# Patient Record
Sex: FEMALE | Race: WHITE | NOT HISPANIC OR LATINO | URBAN - METROPOLITAN AREA
[De-identification: names, ages, dates, MRNs, and addresses within clinical notes are randomized per-mention and may not be internally consistent; named-entity substitution may affect disease eponyms.]

---

## 2017-06-18 ENCOUNTER — EMERGENCY (EMERGENCY)
Facility: HOSPITAL | Age: 82
LOS: 1 days | Discharge: HOME | End: 2017-06-18
Attending: EMERGENCY MEDICINE
Payer: MEDICARE

## 2017-06-18 VITALS
RESPIRATION RATE: 18 BRPM | HEIGHT: 60 IN | DIASTOLIC BLOOD PRESSURE: 90 MMHG | SYSTOLIC BLOOD PRESSURE: 150 MMHG | WEIGHT: 119.93 LBS | OXYGEN SATURATION: 98 % | TEMPERATURE: 98 F | HEART RATE: 80 BPM
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
## 2017-06-18 DIAGNOSIS — Z88.6 ALLERGY STATUS TO ANALGESIC AGENT: ICD-10-CM

## 2017-06-18 DIAGNOSIS — S16.1XXA STRAIN OF MUSCLE, FASCIA AND TENDON AT NECK LEVEL, INITIAL ENCOUNTER: ICD-10-CM

## 2017-06-18 DIAGNOSIS — S00.93XA CONTUSION OF UNSPECIFIED PART OF HEAD, INITIAL ENCOUNTER: ICD-10-CM

## 2017-06-18 DIAGNOSIS — M19.90 UNSPECIFIED OSTEOARTHRITIS, UNSPECIFIED SITE: ICD-10-CM

## 2017-06-18 DIAGNOSIS — M41.9 SCOLIOSIS, UNSPECIFIED: ICD-10-CM

## 2017-06-18 DIAGNOSIS — M54.9 DORSALGIA, UNSPECIFIED: ICD-10-CM

## 2017-06-18 DIAGNOSIS — M47.892 OTHER SPONDYLOSIS, CERVICAL REGION: ICD-10-CM

## 2017-06-18 DIAGNOSIS — M85.88 OTHER SPECIFIED DISORDERS OF BONE DENSITY AND STRUCTURE, OTHER SITE: ICD-10-CM

## 2017-06-18 DIAGNOSIS — G89.29 OTHER CHRONIC PAIN: ICD-10-CM

## 2017-06-18 PROCEDURE — 70450 CT HEAD/BRAIN W/O DYE: CPT | Mod: 26

## 2017-06-18 PROCEDURE — 99284 EMERGENCY DEPT VISIT MOD MDM: CPT

## 2017-06-18 PROCEDURE — 99284 EMERGENCY DEPT VISIT MOD MDM: CPT | Mod: 25

## 2017-06-18 PROCEDURE — 72125 CT NECK SPINE W/O DYE: CPT

## 2017-06-18 PROCEDURE — 72125 CT NECK SPINE W/O DYE: CPT | Mod: 26

## 2017-06-18 PROCEDURE — 70450 CT HEAD/BRAIN W/O DYE: CPT

## 2017-06-18 NOTE — ED PROVIDER NOTE - CARE PLAN
Principal Discharge DX:	Injury of head, initial encounter  Secondary Diagnosis:	Cervical strain, acute, initial encounter

## 2017-06-18 NOTE — ED ADULT NURSE NOTE - OBJECTIVE STATEMENT
AOX3 +ambulatory patient reports she tripped and fell and hit her head with the handle bar of her cart. Denies any LOC
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
## 2017-06-27 ENCOUNTER — APPOINTMENT (OUTPATIENT)
Dept: INTERNAL MEDICINE | Facility: CLINIC | Age: 82
End: 2017-06-27

## 2017-06-27 VITALS
HEIGHT: 60 IN | RESPIRATION RATE: 16 BRPM | HEART RATE: 80 BPM | TEMPERATURE: 97.8 F | OXYGEN SATURATION: 97 % | BODY MASS INDEX: 23.16 KG/M2 | SYSTOLIC BLOOD PRESSURE: 120 MMHG | WEIGHT: 118 LBS | DIASTOLIC BLOOD PRESSURE: 78 MMHG

## 2017-08-08 ENCOUNTER — NON-APPOINTMENT (OUTPATIENT)
Age: 82
End: 2017-08-08

## 2017-08-08 ENCOUNTER — APPOINTMENT (OUTPATIENT)
Dept: INTERNAL MEDICINE | Facility: CLINIC | Age: 82
End: 2017-08-08
Payer: MEDICARE

## 2017-08-08 VITALS
HEART RATE: 82 BPM | RESPIRATION RATE: 16 BRPM | TEMPERATURE: 97.8 F | HEIGHT: 60 IN | DIASTOLIC BLOOD PRESSURE: 80 MMHG | SYSTOLIC BLOOD PRESSURE: 120 MMHG | BODY MASS INDEX: 23.16 KG/M2 | WEIGHT: 118 LBS | OXYGEN SATURATION: 98 %

## 2017-08-08 DIAGNOSIS — H26.9 UNSPECIFIED CATARACT: ICD-10-CM

## 2017-08-08 PROCEDURE — 99213 OFFICE O/P EST LOW 20 MIN: CPT | Mod: 25

## 2017-08-08 PROCEDURE — 93000 ELECTROCARDIOGRAM COMPLETE: CPT

## 2017-11-27 ENCOUNTER — RX RENEWAL (OUTPATIENT)
Age: 82
End: 2017-11-27

## 2018-02-20 ENCOUNTER — NON-APPOINTMENT (OUTPATIENT)
Age: 83
End: 2018-02-20

## 2018-02-20 ENCOUNTER — LABORATORY RESULT (OUTPATIENT)
Age: 83
End: 2018-02-20

## 2018-02-20 ENCOUNTER — APPOINTMENT (OUTPATIENT)
Dept: INTERNAL MEDICINE | Facility: CLINIC | Age: 83
End: 2018-02-20
Payer: MEDICARE

## 2018-02-20 VITALS
SYSTOLIC BLOOD PRESSURE: 120 MMHG | DIASTOLIC BLOOD PRESSURE: 70 MMHG | OXYGEN SATURATION: 98 % | HEIGHT: 60 IN | WEIGHT: 108 LBS | HEART RATE: 85 BPM | BODY MASS INDEX: 21.2 KG/M2 | RESPIRATION RATE: 16 BRPM | TEMPERATURE: 97.3 F

## 2018-02-20 DIAGNOSIS — R63.4 ABNORMAL WEIGHT LOSS: ICD-10-CM

## 2018-02-20 DIAGNOSIS — R73.09 OTHER ABNORMAL GLUCOSE: ICD-10-CM

## 2018-02-20 DIAGNOSIS — M54.12 RADICULOPATHY, CERVICAL REGION: ICD-10-CM

## 2018-02-20 PROCEDURE — 99214 OFFICE O/P EST MOD 30 MIN: CPT | Mod: 25

## 2018-02-20 PROCEDURE — 93000 ELECTROCARDIOGRAM COMPLETE: CPT

## 2018-02-21 LAB
25(OH)D3 SERPL-MCNC: 32.9 NG/ML
ALBUMIN SERPL ELPH-MCNC: 4.2 G/DL
ALP BLD-CCNC: 75 U/L
ALT SERPL-CCNC: 13 U/L
ANION GAP SERPL CALC-SCNC: 11 MMOL/L
APPEARANCE: ABNORMAL
AST SERPL-CCNC: 21 U/L
BASOPHILS # BLD AUTO: 0.04 K/UL
BASOPHILS NFR BLD AUTO: 0.6 %
BILIRUB SERPL-MCNC: 0.5 MG/DL
BILIRUBIN URINE: ABNORMAL
BLOOD URINE: NEGATIVE
BUN SERPL-MCNC: 28 MG/DL
CALCIUM SERPL-MCNC: 9.6 MG/DL
CHLORIDE SERPL-SCNC: 105 MMOL/L
CHOLEST SERPL-MCNC: 166 MG/DL
CHOLEST/HDLC SERPL: 2.1 RATIO
CO2 SERPL-SCNC: 30 MMOL/L
COLOR: ABNORMAL
CREAT SERPL-MCNC: 0.8 MG/DL
CREAT SPEC-SCNC: 269 MG/DL
EOSINOPHIL # BLD AUTO: 0.09 K/UL
EOSINOPHIL NFR BLD AUTO: 1.4 %
ERYTHROCYTE [SEDIMENTATION RATE] IN BLOOD BY WESTERGREN METHOD: 4 MM/HR
FOLATE SERPL-MCNC: 13.7 NG/ML
GGT SERPL-CCNC: 11 U/L
GLUCOSE QUALITATIVE U: NEGATIVE MG/DL
GLUCOSE SERPL-MCNC: 83 MG/DL
HBA1C MFR BLD HPLC: 5.4 %
HCT VFR BLD CALC: 43.1 %
HDLC SERPL-MCNC: 79 MG/DL
HGB BLD-MCNC: 13.7 G/DL
IMM GRANULOCYTES NFR BLD AUTO: 0.2 %
IRON SATN MFR SERPL: 34 %
IRON SERPL-MCNC: 112 UG/DL
KETONES URINE: ABNORMAL
LDLC SERPL CALC-MCNC: 76 MG/DL
LEUKOCYTE ESTERASE URINE: NEGATIVE
LYMPHOCYTES # BLD AUTO: 1.74 K/UL
LYMPHOCYTES NFR BLD AUTO: 26.1 %
MAN DIFF?: NORMAL
MCHC RBC-ENTMCNC: 31 PG
MCHC RBC-ENTMCNC: 31.8 GM/DL
MCV RBC AUTO: 97.5 FL
MICROALBUMIN 24H UR DL<=1MG/L-MCNC: 1.9 MG/DL
MICROALBUMIN/CREAT 24H UR-RTO: 7 MG/G
MONOCYTES # BLD AUTO: 0.66 K/UL
MONOCYTES NFR BLD AUTO: 9.9 %
NEUTROPHILS # BLD AUTO: 4.12 K/UL
NEUTROPHILS NFR BLD AUTO: 61.8 %
NITRITE URINE: NEGATIVE
PH URINE: 5
PLATELET # BLD AUTO: 210 K/UL
POTASSIUM SERPL-SCNC: 4.5 MMOL/L
PROT SERPL-MCNC: 7.1 G/DL
PROTEIN URINE: NEGATIVE MG/DL
RBC # BLD: 4.42 M/UL
RBC # FLD: 14.7 %
SODIUM SERPL-SCNC: 146 MMOL/L
SPECIFIC GRAVITY URINE: 1.04
T3 SERPL-MCNC: 80 NG/DL
T4 FREE SERPL-MCNC: 1.8 NG/DL
TIBC SERPL-MCNC: 327 UG/DL
TRIGL SERPL-MCNC: 54 MG/DL
TSH SERPL-ACNC: 0.85 UIU/ML
UIBC SERPL-MCNC: 215 UG/DL
UROBILINOGEN URINE: NEGATIVE MG/DL
VIT B12 SERPL-MCNC: 372 PG/ML
WBC # FLD AUTO: 6.66 K/UL

## 2018-03-07 ENCOUNTER — RX RENEWAL (OUTPATIENT)
Age: 83
End: 2018-03-07

## 2018-03-13 ENCOUNTER — APPOINTMENT (OUTPATIENT)
Dept: INTERNAL MEDICINE | Facility: CLINIC | Age: 83
End: 2018-03-13

## 2018-04-06 ENCOUNTER — CLINICAL ADVICE (OUTPATIENT)
Age: 83
End: 2018-04-06

## 2018-04-06 DIAGNOSIS — K46.9 UNSPECIFIED ABDOMINAL HERNIA W/OUT OBSTRUCTION OR GANGRENE: ICD-10-CM

## 2018-04-17 ENCOUNTER — APPOINTMENT (OUTPATIENT)
Dept: SURGERY | Facility: CLINIC | Age: 83
End: 2018-04-17
Payer: MEDICARE

## 2018-04-17 VITALS
SYSTOLIC BLOOD PRESSURE: 164 MMHG | HEIGHT: 60 IN | BODY MASS INDEX: 21.2 KG/M2 | HEART RATE: 92 BPM | DIASTOLIC BLOOD PRESSURE: 101 MMHG | OXYGEN SATURATION: 96 % | WEIGHT: 108 LBS

## 2018-04-17 PROCEDURE — 99203 OFFICE O/P NEW LOW 30 MIN: CPT

## 2018-05-18 ENCOUNTER — CLINICAL ADVICE (OUTPATIENT)
Age: 83
End: 2018-05-18

## 2018-05-22 ENCOUNTER — APPOINTMENT (OUTPATIENT)
Dept: CARDIOLOGY | Facility: CLINIC | Age: 83
End: 2018-05-22
Payer: MEDICARE

## 2018-05-22 VITALS
DIASTOLIC BLOOD PRESSURE: 76 MMHG | BODY MASS INDEX: 21.99 KG/M2 | HEART RATE: 92 BPM | SYSTOLIC BLOOD PRESSURE: 132 MMHG | OXYGEN SATURATION: 94 % | WEIGHT: 112 LBS | HEIGHT: 60 IN | TEMPERATURE: 97.6 F

## 2018-05-22 DIAGNOSIS — Z83.3 FAMILY HISTORY OF DIABETES MELLITUS: ICD-10-CM

## 2018-05-22 DIAGNOSIS — R06.00 DYSPNEA, UNSPECIFIED: ICD-10-CM

## 2018-05-22 DIAGNOSIS — Z82.49 FAMILY HISTORY OF ISCHEMIC HEART DISEASE AND OTHER DISEASES OF THE CIRCULATORY SYSTEM: ICD-10-CM

## 2018-05-22 DIAGNOSIS — R60.0 LOCALIZED EDEMA: ICD-10-CM

## 2018-05-22 DIAGNOSIS — Z82.3 FAMILY HISTORY OF STROKE: ICD-10-CM

## 2018-05-22 PROCEDURE — 99204 OFFICE O/P NEW MOD 45 MIN: CPT

## 2018-05-28 ENCOUNTER — OUTPATIENT (OUTPATIENT)
Dept: OUTPATIENT SERVICES | Facility: HOSPITAL | Age: 83
LOS: 1 days | End: 2018-05-28
Payer: MEDICARE

## 2018-05-28 DIAGNOSIS — R60.0 LOCALIZED EDEMA: ICD-10-CM

## 2018-05-28 PROCEDURE — 93306 TTE W/DOPPLER COMPLETE: CPT | Mod: 26

## 2018-05-28 PROCEDURE — 93306 TTE W/DOPPLER COMPLETE: CPT

## 2018-05-29 LAB
ANION GAP SERPL CALC-SCNC: 14 MMOL/L
BUN SERPL-MCNC: 29 MG/DL
CALCIUM SERPL-MCNC: 10 MG/DL
CHLORIDE SERPL-SCNC: 103 MMOL/L
CO2 SERPL-SCNC: 26 MMOL/L
CREAT SERPL-MCNC: 0.82 MG/DL
GLUCOSE SERPL-MCNC: 81 MG/DL
NT-PROBNP SERPL-MCNC: 470 PG/ML
POTASSIUM SERPL-SCNC: 4.8 MMOL/L
SODIUM SERPL-SCNC: 143 MMOL/L

## 2018-06-06 ENCOUNTER — APPOINTMENT (OUTPATIENT)
Dept: CARDIOLOGY | Facility: CLINIC | Age: 83
End: 2018-06-06
Payer: MEDICARE

## 2018-06-06 VITALS
TEMPERATURE: 97.4 F | WEIGHT: 108 LBS | SYSTOLIC BLOOD PRESSURE: 110 MMHG | HEIGHT: 60 IN | HEART RATE: 93 BPM | DIASTOLIC BLOOD PRESSURE: 80 MMHG | OXYGEN SATURATION: 90 % | BODY MASS INDEX: 21.2 KG/M2

## 2018-06-06 DIAGNOSIS — I35.0 NONRHEUMATIC AORTIC (VALVE) STENOSIS: ICD-10-CM

## 2018-06-06 PROCEDURE — 99214 OFFICE O/P EST MOD 30 MIN: CPT

## 2018-07-03 ENCOUNTER — APPOINTMENT (OUTPATIENT)
Dept: CARDIOLOGY | Facility: CLINIC | Age: 83
End: 2018-07-03
Payer: MEDICARE

## 2018-07-03 VITALS
WEIGHT: 108 LBS | DIASTOLIC BLOOD PRESSURE: 72 MMHG | BODY MASS INDEX: 21.2 KG/M2 | HEIGHT: 60 IN | HEART RATE: 64 BPM | SYSTOLIC BLOOD PRESSURE: 120 MMHG | TEMPERATURE: 97.6 F | OXYGEN SATURATION: 92 %

## 2018-07-03 PROCEDURE — 99214 OFFICE O/P EST MOD 30 MIN: CPT

## 2018-07-19 ENCOUNTER — RX RENEWAL (OUTPATIENT)
Age: 83
End: 2018-07-19

## 2018-10-02 PROBLEM — M54.9 DORSALGIA, UNSPECIFIED: Chronic | Status: ACTIVE | Noted: 2017-06-18

## 2018-10-02 PROBLEM — M19.90 UNSPECIFIED OSTEOARTHRITIS, UNSPECIFIED SITE: Chronic | Status: ACTIVE | Noted: 2017-06-18
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
## 2018-10-02 PROBLEM — M41.9 SCOLIOSIS, UNSPECIFIED: Chronic | Status: ACTIVE | Noted: 2017-06-18

## 2018-10-16 ENCOUNTER — APPOINTMENT (OUTPATIENT)
Dept: CARDIOLOGY | Facility: CLINIC | Age: 83
End: 2018-10-16
Payer: MEDICARE

## 2018-10-16 VITALS
HEIGHT: 60 IN | BODY MASS INDEX: 21.79 KG/M2 | DIASTOLIC BLOOD PRESSURE: 76 MMHG | HEART RATE: 73 BPM | SYSTOLIC BLOOD PRESSURE: 130 MMHG | OXYGEN SATURATION: 90 % | TEMPERATURE: 97.5 F | WEIGHT: 111 LBS

## 2018-10-16 PROCEDURE — 99213 OFFICE O/P EST LOW 20 MIN: CPT

## 2018-12-03 ENCOUNTER — APPOINTMENT (OUTPATIENT)
Dept: INTERNAL MEDICINE | Facility: CLINIC | Age: 83
End: 2018-12-03
Payer: MEDICARE

## 2018-12-03 VITALS
TEMPERATURE: 97 F | SYSTOLIC BLOOD PRESSURE: 130 MMHG | OXYGEN SATURATION: 91 % | RESPIRATION RATE: 16 BRPM | BODY MASS INDEX: 21.01 KG/M2 | DIASTOLIC BLOOD PRESSURE: 80 MMHG | HEIGHT: 60 IN | WEIGHT: 107 LBS | HEART RATE: 90 BPM

## 2018-12-03 DIAGNOSIS — Z00.00 ENCOUNTER FOR GENERAL ADULT MEDICAL EXAMINATION W/OUT ABNORMAL FINDINGS: ICD-10-CM

## 2018-12-03 PROCEDURE — G0439: CPT

## 2018-12-03 NOTE — ASSESSMENT
[FreeTextEntry1] : 89 year old female found to have stable  Acquired Hypothyroidism, Cervical Radiculopathy, Arthritis, Hypercholesterolemia, .Aortic Stenosis, Mitral Regurgitation, Vitamin D Deficiency, improved Edema of legs,with the current regimen, diet and life style modifications, as counseled. Prior results reviewed and discussed with the patient during today's examination. Plan as ordered.\par Patient was recently evaluated by [facility], findings and recommendations reviewed with the patient during today's examination.\par Patient is refusing all immunizations, in spite of counseling risks and benefits.\par

## 2018-12-03 NOTE — HEALTH RISK ASSESSMENT
[Good] : ~his/her~  mood as  good [No falls in past year] : Patient reported no falls in the past year [0] : 2) Feeling down, depressed, or hopeless: Not at all (0) [None] : None [Feels Safe at Home] : Feels safe at home [Independent] : using telephone [Some assistance needed] : managing finances [Smoke Detector] : smoke detector [Carbon Monoxide Detector] : carbon monoxide detector [Seat Belt] :  uses seat belt [Sunscreen] : uses sunscreen [Discussed at today's visit] : Advance Directives Discussed at today's visit [FreeTextEntry1] : Check up.\par  [] : No [de-identified] : CARD [[id number]] : 0 [Change in mental status noted] : No change in mental status noted [Reports changes in hearing] : Reports no changes in hearing [Reports changes in vision] : Reports no changes in vision [Reports changes in dental health] : Reports no changes in dental health

## 2018-12-03 NOTE — HISTORY OF PRESENT ILLNESS
[de-identified] : 89 year old female patient with history of stable Acquired Hypothyroidism, Cervical Radiculopathy, Arthritis, Hypercholesterolemia, .Aortic Stenosis, Mitral Regurgitation, Vitamin D Deficiency, Edema of legs, history as stated, presented for an annual preventative examination. Patient denies any associated symptoms of shortness of breath, chest pain, abdominal pain at this time.\par Supplemental history was obtained from cousin, who is accompanying the patient for today's examination.\par Non-compliant with meds at times, counseled to be compliant.

## 2018-12-04 LAB
ALBUMIN SERPL ELPH-MCNC: 4.6 G/DL
ALP BLD-CCNC: 72 U/L
ALT SERPL-CCNC: 10 U/L
ANION GAP SERPL CALC-SCNC: 10 MMOL/L
AST SERPL-CCNC: 20 U/L
BASOPHILS # BLD AUTO: 0.04 K/UL
BASOPHILS NFR BLD AUTO: 0.5 %
BILIRUB SERPL-MCNC: 0.5 MG/DL
BUN SERPL-MCNC: 34 MG/DL
CALCIUM SERPL-MCNC: 10.5 MG/DL
CHLORIDE SERPL-SCNC: 99 MMOL/L
CHOLEST SERPL-MCNC: 220 MG/DL
CHOLEST/HDLC SERPL: 2.4 RATIO
CO2 SERPL-SCNC: 31 MMOL/L
CREAT SERPL-MCNC: 1.1 MG/DL
EOSINOPHIL # BLD AUTO: 0.12 K/UL
EOSINOPHIL NFR BLD AUTO: 1.4 %
GGT SERPL-CCNC: 10 U/L
GLUCOSE SERPL-MCNC: 86 MG/DL
HBA1C MFR BLD HPLC: 5.3 %
HCT VFR BLD CALC: 43.3 %
HDLC SERPL-MCNC: 91 MG/DL
HGB BLD-MCNC: 13.8 G/DL
IMM GRANULOCYTES NFR BLD AUTO: 0.4 %
LDLC SERPL CALC-MCNC: 117 MG/DL
LYMPHOCYTES # BLD AUTO: 1.74 K/UL
LYMPHOCYTES NFR BLD AUTO: 20.7 %
MAN DIFF?: NORMAL
MCHC RBC-ENTMCNC: 31.7 PG
MCHC RBC-ENTMCNC: 31.9 GM/DL
MCV RBC AUTO: 99.5 FL
MONOCYTES # BLD AUTO: 0.83 K/UL
MONOCYTES NFR BLD AUTO: 9.9 %
NEUTROPHILS # BLD AUTO: 5.66 K/UL
NEUTROPHILS NFR BLD AUTO: 67.1 %
PLATELET # BLD AUTO: 239 K/UL
POTASSIUM SERPL-SCNC: 4.6 MMOL/L
PROT SERPL-MCNC: 7.8 G/DL
RBC # BLD: 4.35 M/UL
RBC # FLD: 13.8 %
SODIUM SERPL-SCNC: 140 MMOL/L
T3 SERPL-MCNC: 62 NG/DL
T4 FREE SERPL-MCNC: 0.8 NG/DL
TRIGL SERPL-MCNC: 62 MG/DL
TSH SERPL-ACNC: 58.9 UIU/ML
WBC # FLD AUTO: 8.42 K/UL

## 2019-01-01 ENCOUNTER — APPOINTMENT (OUTPATIENT)
Dept: INTERNAL MEDICINE | Facility: CLINIC | Age: 84
End: 2019-01-01
Payer: MEDICARE

## 2019-01-01 ENCOUNTER — RX RENEWAL (OUTPATIENT)
Age: 84
End: 2019-01-01

## 2019-01-01 ENCOUNTER — APPOINTMENT (OUTPATIENT)
Dept: CARDIOLOGY | Facility: CLINIC | Age: 84
End: 2019-01-01

## 2019-01-01 ENCOUNTER — APPOINTMENT (OUTPATIENT)
Dept: CARDIOLOGY | Facility: CLINIC | Age: 84
End: 2019-01-01
Payer: MEDICARE

## 2019-01-01 VITALS
SYSTOLIC BLOOD PRESSURE: 131 MMHG | BODY MASS INDEX: 17.47 KG/M2 | HEART RATE: 95 BPM | HEIGHT: 60 IN | TEMPERATURE: 97.2 F | WEIGHT: 89 LBS | OXYGEN SATURATION: 94 % | DIASTOLIC BLOOD PRESSURE: 86 MMHG

## 2019-01-01 VITALS
SYSTOLIC BLOOD PRESSURE: 130 MMHG | DIASTOLIC BLOOD PRESSURE: 80 MMHG | WEIGHT: 87 LBS | HEIGHT: 55 IN | BODY MASS INDEX: 20.13 KG/M2 | RESPIRATION RATE: 12 BRPM | OXYGEN SATURATION: 82 % | TEMPERATURE: 98.2 F | HEART RATE: 98 BPM

## 2019-01-01 DIAGNOSIS — Z23 ENCOUNTER FOR IMMUNIZATION: ICD-10-CM

## 2019-01-01 DIAGNOSIS — E03.9 HYPOTHYROIDISM, UNSPECIFIED: ICD-10-CM

## 2019-01-01 DIAGNOSIS — E78.00 PURE HYPERCHOLESTEROLEMIA, UNSPECIFIED: ICD-10-CM

## 2019-01-01 LAB
ALBUMIN SERPL ELPH-MCNC: 4.1 G/DL
ALP BLD-CCNC: 71 U/L
ALT SERPL-CCNC: 13 U/L
ANION GAP SERPL CALC-SCNC: 14 MMOL/L
AST SERPL-CCNC: 21 U/L
BASOPHILS # BLD AUTO: 0.08 K/UL
BASOPHILS NFR BLD AUTO: 1.2 %
BILIRUB SERPL-MCNC: 0.7 MG/DL
BUN SERPL-MCNC: 28 MG/DL
CALCIUM SERPL-MCNC: 9.8 MG/DL
CHLORIDE SERPL-SCNC: 103 MMOL/L
CHOLEST SERPL-MCNC: 142 MG/DL
CHOLEST/HDLC SERPL: 2 RATIO
CO2 SERPL-SCNC: 25 MMOL/L
CREAT SERPL-MCNC: 0.67 MG/DL
EOSINOPHIL # BLD AUTO: 0.11 K/UL
EOSINOPHIL NFR BLD AUTO: 1.7 %
ESTIMATED AVERAGE GLUCOSE: 100 MG/DL
GGT SERPL-CCNC: 13 U/L
GLUCOSE SERPL-MCNC: 103 MG/DL
HBA1C MFR BLD HPLC: 5.1 %
HCT VFR BLD CALC: 43.6 %
HDLC SERPL-MCNC: 73 MG/DL
HGB BLD-MCNC: 13.3 G/DL
IMM GRANULOCYTES NFR BLD AUTO: 0.3 %
LDLC SERPL CALC-MCNC: 59 MG/DL
LYMPHOCYTES # BLD AUTO: 1.45 K/UL
LYMPHOCYTES NFR BLD AUTO: 22.1 %
MAN DIFF?: NORMAL
MCHC RBC-ENTMCNC: 30.5 GM/DL
MCHC RBC-ENTMCNC: 31.1 PG
MCV RBC AUTO: 101.9 FL
MONOCYTES # BLD AUTO: 0.57 K/UL
MONOCYTES NFR BLD AUTO: 8.7 %
NEUTROPHILS # BLD AUTO: 4.33 K/UL
NEUTROPHILS NFR BLD AUTO: 66 %
PLATELET # BLD AUTO: 211 K/UL
POTASSIUM SERPL-SCNC: 4.6 MMOL/L
PROT SERPL-MCNC: 7.1 G/DL
RBC # BLD: 4.28 M/UL
RBC # FLD: 14.6 %
SODIUM SERPL-SCNC: 142 MMOL/L
T3 SERPL-MCNC: 71 NG/DL
T4 FREE SERPL-MCNC: 1.6 NG/DL
TRIGL SERPL-MCNC: 50 MG/DL
TSH SERPL-ACNC: 1.1 UIU/ML
WBC # FLD AUTO: 6.56 K/UL

## 2019-01-01 PROCEDURE — 93000 ELECTROCARDIOGRAM COMPLETE: CPT

## 2019-01-01 PROCEDURE — 90674 CCIIV4 VAC NO PRSV 0.5 ML IM: CPT

## 2019-01-01 PROCEDURE — 99214 OFFICE O/P EST MOD 30 MIN: CPT | Mod: 25

## 2019-01-01 PROCEDURE — 99213 OFFICE O/P EST LOW 20 MIN: CPT

## 2019-01-01 PROCEDURE — G0008: CPT

## 2019-03-10 ENCOUNTER — RX RENEWAL (OUTPATIENT)
Age: 84
End: 2019-03-10

## 2019-03-11 ENCOUNTER — APPOINTMENT (OUTPATIENT)
Dept: INTERNAL MEDICINE | Facility: CLINIC | Age: 84
End: 2019-03-11
Payer: MEDICARE

## 2019-03-11 VITALS
DIASTOLIC BLOOD PRESSURE: 70 MMHG | TEMPERATURE: 98 F | HEART RATE: 83 BPM | BODY MASS INDEX: 20.42 KG/M2 | RESPIRATION RATE: 16 BRPM | WEIGHT: 104 LBS | HEIGHT: 60 IN | SYSTOLIC BLOOD PRESSURE: 100 MMHG | OXYGEN SATURATION: 95 %

## 2019-03-11 PROCEDURE — 99214 OFFICE O/P EST MOD 30 MIN: CPT

## 2019-03-11 NOTE — HISTORY OF PRESENT ILLNESS
[de-identified] : 89 year old  female patient with history of stable Acquired Hypothyroidism, CHF with preserved LVF, MR, Pulm HTN, Hypercholesterolemia, Elevated Hemoglobin A1c, Cervical Radiculopathy, Vitamin D Deficiency, Arthritis, history as stated, presented for follow up examination. Patient is compliant with all medications. Denies shortness of breath, chest pain or abdominal pains at this time. ROS as stated.\par S/P resolving cough.\par

## 2019-03-11 NOTE — ASSESSMENT
[FreeTextEntry1] : 89 year old female found to have stable Acquired Hypothyroidism, CHF with preserved LVF, MR, Pulm HTN, Hypercholesterolemia, Elevated Hemoglobin A1c, Cervical Radiculopathy, Vitamin D Deficiency, Arthritis,with the current regimen, diet and life style modifications, as counseled. Prior results reviewed and discussed with the patient during today's examination. Plan as ordered.\par Supplemental history was obtained from niece, who is accompanying the patient for today's examination.\par

## 2019-03-12 LAB
25(OH)D3 SERPL-MCNC: 54.8 NG/ML
ALBUMIN SERPL ELPH-MCNC: 4.1 G/DL
ALP BLD-CCNC: 55 U/L
ALT SERPL-CCNC: 16 U/L
ANION GAP SERPL CALC-SCNC: 11 MMOL/L
APPEARANCE: CLEAR
AST SERPL-CCNC: 24 U/L
BASOPHILS # BLD AUTO: 0.07 K/UL
BASOPHILS NFR BLD AUTO: 1 %
BILIRUB SERPL-MCNC: 0.3 MG/DL
BILIRUBIN URINE: NEGATIVE
BLOOD URINE: NEGATIVE
BUN SERPL-MCNC: 21 MG/DL
CALCIUM SERPL-MCNC: 9.7 MG/DL
CHLORIDE SERPL-SCNC: 99 MMOL/L
CHOLEST SERPL-MCNC: 183 MG/DL
CHOLEST/HDLC SERPL: 2.4 RATIO
CO2 SERPL-SCNC: 30 MMOL/L
COLOR: NORMAL
CREAT SERPL-MCNC: 1.07 MG/DL
CREAT SPEC-SCNC: 94 MG/DL
EOSINOPHIL # BLD AUTO: 0.12 K/UL
EOSINOPHIL NFR BLD AUTO: 1.7 %
ERYTHROCYTE [SEDIMENTATION RATE] IN BLOOD BY WESTERGREN METHOD: 19 MM/HR
FOLATE SERPL-MCNC: 10.9 NG/ML
GGT SERPL-CCNC: 10 U/L
GLUCOSE QUALITATIVE U: NEGATIVE
GLUCOSE SERPL-MCNC: 98 MG/DL
HBA1C MFR BLD HPLC: 5.5 %
HCT VFR BLD CALC: 43.1 %
HDLC SERPL-MCNC: 76 MG/DL
HGB BLD-MCNC: 13.1 G/DL
IMM GRANULOCYTES NFR BLD AUTO: 0.6 %
IRON SATN MFR SERPL: 35 %
IRON SERPL-MCNC: 103 UG/DL
KETONES URINE: NEGATIVE
LDLC SERPL CALC-MCNC: 95 MG/DL
LEUKOCYTE ESTERASE URINE: NEGATIVE
LYMPHOCYTES # BLD AUTO: 1.95 K/UL
LYMPHOCYTES NFR BLD AUTO: 27 %
MAN DIFF?: NORMAL
MCHC RBC-ENTMCNC: 30.4 GM/DL
MCHC RBC-ENTMCNC: 31.3 PG
MCV RBC AUTO: 103.1 FL
MICROALBUMIN 24H UR DL<=1MG/L-MCNC: <1.2 MG/DL
MICROALBUMIN/CREAT 24H UR-RTO: NORMAL MG/G
MONOCYTES # BLD AUTO: 0.87 K/UL
MONOCYTES NFR BLD AUTO: 12.1 %
NEUTROPHILS # BLD AUTO: 4.16 K/UL
NEUTROPHILS NFR BLD AUTO: 57.6 %
NITRITE URINE: NEGATIVE
PH URINE: 6
PLATELET # BLD AUTO: 246 K/UL
POTASSIUM SERPL-SCNC: 4.8 MMOL/L
PROT SERPL-MCNC: 7.2 G/DL
PROTEIN URINE: NEGATIVE
RBC # BLD: 4.18 M/UL
RBC # FLD: 14.3 %
SODIUM SERPL-SCNC: 140 MMOL/L
SPECIFIC GRAVITY URINE: 1.01
T3 SERPL-MCNC: 72 NG/DL
T4 FREE SERPL-MCNC: 1.2 NG/DL
TIBC SERPL-MCNC: 291 UG/DL
TRIGL SERPL-MCNC: 61 MG/DL
TSH SERPL-ACNC: 43.5 UIU/ML
UIBC SERPL-MCNC: 188 UG/DL
UROBILINOGEN URINE: NORMAL
VIT B12 SERPL-MCNC: 494 PG/ML
WBC # FLD AUTO: 7.21 K/UL

## 2019-04-30 ENCOUNTER — APPOINTMENT (OUTPATIENT)
Dept: CARDIOLOGY | Facility: CLINIC | Age: 84
End: 2019-04-30
Payer: MEDICARE

## 2019-04-30 VITALS
WEIGHT: 101 LBS | HEART RATE: 101 BPM | DIASTOLIC BLOOD PRESSURE: 77 MMHG | OXYGEN SATURATION: 86 % | TEMPERATURE: 97.2 F | SYSTOLIC BLOOD PRESSURE: 127 MMHG | BODY MASS INDEX: 19.83 KG/M2 | HEIGHT: 60 IN

## 2019-04-30 PROCEDURE — 99214 OFFICE O/P EST MOD 30 MIN: CPT

## 2019-04-30 NOTE — ASSESSMENT
[FreeTextEntry1] : 89-year-old female with noted PMH including hypothyroidism and HLD who presents for follow-up of HFpEF and severe MR, and ? mildly increased dyspnea since her last visit \par \par 1. HFpEF: Patient is on lisinopril and has not really been using her PRN furosemide; appears mostly euvolemic on exam (mild crackles are unchanged from prior and are likely result of her scoliosis and resulting dependent atelectasis); some worsening of LE edema\par -Will switch patient to standing furosemide 20mg PO Q48H [patient prefers to start with this vs. daily dosing] and check renal function within 1-2 weeks. \par \par 2. Severe MR: We discussed that valvular disease requires surgical treatment, however patient states that given her age and frailty she absolutely does not want any surgery, even including percutaneous approach. \par -Will continue on low-dose lisinopril 5mg for afterload reduction in setting of MR and to help with mild diuresis. Her pHTN is likely secondary to the MR. \par -Started on low-dose furosemide as per above; cannot be very aggressive with diuresis given patient's age and AS giving some degree of preload dependence. \par \par 3.HLD: Patient out of benefit range of statin due to her age\par \par Patient will follow-up in the office in 3 months.\par \par Of note, patient's niece Sita should be called with any updates or questions at [phone]

## 2019-04-30 NOTE — HISTORY OF PRESENT ILLNESS
[FreeTextEntry1] : 89-year-old female with hypothyroidism, HLD, scoliosis, HFpEF and severe mitral regurgitation with severe pulmonary HTN who presents for scheduled follow-up visit, accompanied by her niece. \par \par Patient reports she is compliant with the medication daily, and her niece calls her to remind her to take her medications. She denies any adverse effects such as dry cough. She denies orthopnea or PND, is able to do most of her ADLs without significant dyspnea, though thinks her dyspnea did get worse now compared to her prior visit. No symptoms at rest. No  chest pain, lightheadedness, or syncope. Mostly walks within her house. \par \par Niece wants patient to go to assistant living facility for supervision of medication administration as well as help with ADLs, though patient is reluctant (she has private-hire part-time HHA for now)

## 2019-08-06 NOTE — ASSESSMENT
[FreeTextEntry1] : 89-year-old female with noted PMH including hypothyroidism and HLD who presents for follow-up of HFpEF and severe MR\par \par 1. HFpEF: Patient is on lisinopril and has not really been using her PRN furosemide; appears mostly euvolemic on exam (mild crackles are unchanged from prior and are likely result of her scoliosis and resulting dependent atelectasis); minimal LE edema\par -D/W patient and niece regarding indications for additional furosemide dose\par \par 2. Severe MR: We discussed that valvular disease requires surgical treatment, however patient states that given her age and frailty she absolutely does not want any surgery, even including percutaneous approach. This may be reasonable as patient is at much higher risk of complications. \par -Will continue on low-dose lisinopril 5mg for afterload reduction in setting of MR and to help with mild diuresis. Her pHTN is likely secondary to the MR. \par -PRN low-dose furosemide as per above; cannot be very aggressive with diuresis given patient's age and AS giving some degree of preload dependence. \par \par 3.HLD: Patient out of benefit range of statin due to her age\par \par 4. Worsening appetite: May be sign of overall progressive decline, also decreased physical activity levels\par \par Patient will follow-up in the office in 3 months.\par \par Of note, patient's niece Sita should be called with any updates or questions at [phone]

## 2019-08-06 NOTE — HISTORY OF PRESENT ILLNESS
[FreeTextEntry1] : 89-year-old female with hypothyroidism, HLD, scoliosis, HFpEF and severe mitral regurgitation with severe pulmonary HTN who presents for scheduled follow-up visit, accompanied by her niece. \par \par Patient reports she is compliant with the medication daily, and her niece calls her to remind her to take her medications. She denies any adverse effects such as dry cough. She denies orthopnea or PND, is able to do most of her ADLs without significant dyspnea. No symptoms at rest. No  chest pain, lightheadedness, or syncope. Has been more sedentary. \par \par She does complain of poor appetite, has lost about 4 lbs over past 3 months. \par \par

## 2019-10-15 PROBLEM — Z23 INFLUENZA VACCINE NEEDED: Status: ACTIVE | Noted: 2019-01-01

## 2020-01-01 ENCOUNTER — APPOINTMENT (OUTPATIENT)
Dept: CARDIOLOGY | Facility: CLINIC | Age: 85
End: 2020-01-01
Payer: MEDICARE

## 2020-01-01 ENCOUNTER — APPOINTMENT (OUTPATIENT)
Dept: INTERNAL MEDICINE | Facility: CLINIC | Age: 85
End: 2020-01-01

## 2020-01-01 ENCOUNTER — RX RENEWAL (OUTPATIENT)
Age: 85
End: 2020-01-01

## 2020-01-01 VITALS
HEART RATE: 102 BPM | OXYGEN SATURATION: 88 % | HEIGHT: 55 IN | BODY MASS INDEX: 19.21 KG/M2 | SYSTOLIC BLOOD PRESSURE: 131 MMHG | DIASTOLIC BLOOD PRESSURE: 85 MMHG | TEMPERATURE: 97.6 F | WEIGHT: 83 LBS

## 2020-01-01 VITALS — HEART RATE: 89 BPM | OXYGEN SATURATION: 89 %

## 2020-01-01 DIAGNOSIS — I50.30 UNSPECIFIED DIASTOLIC (CONGESTIVE) HEART FAILURE: ICD-10-CM

## 2020-01-01 DIAGNOSIS — I27.20 PULMONARY HYPERTENSION, UNSPECIFIED: ICD-10-CM

## 2020-01-01 DIAGNOSIS — I34.0 NONRHEUMATIC MITRAL (VALVE) INSUFFICIENCY: ICD-10-CM

## 2020-01-01 LAB
ANION GAP SERPL CALC-SCNC: 14 MMOL/L
BUN SERPL-MCNC: 45 MG/DL
CALCIUM SERPL-MCNC: 10.3 MG/DL
CHLORIDE SERPL-SCNC: 100 MMOL/L
CO2 SERPL-SCNC: 32 MMOL/L
CREAT SERPL-MCNC: 0.96 MG/DL
GLUCOSE SERPL-MCNC: 105 MG/DL
POTASSIUM SERPL-SCNC: 4.8 MMOL/L
SODIUM SERPL-SCNC: 145 MMOL/L

## 2020-01-01 PROCEDURE — 99214 OFFICE O/P EST MOD 30 MIN: CPT

## 2020-01-01 PROCEDURE — 93000 ELECTROCARDIOGRAM COMPLETE: CPT

## 2020-01-01 RX ORDER — FUROSEMIDE 20 MG/1
20 TABLET ORAL
Qty: 30 | Refills: 3 | Status: ACTIVE | COMMUNITY
Start: 2018-05-22 | End: 1900-01-01

## 2020-01-01 RX ORDER — LISINOPRIL 5 MG/1
5 TABLET ORAL DAILY
Qty: 90 | Refills: 1 | Status: ACTIVE | COMMUNITY
Start: 2018-06-06 | End: 1900-01-01

## 2020-02-16 NOTE — HISTORY OF PRESENT ILLNESS
[de-identified] : 89 year old  female patient with history of stable Acquired Hypothyroidism, CHF with preserved LVF, MR, Pulm HTN, Hypercholesterolemia, Elevated Hemoglobin A1c, Cervical Radiculopathy, Vitamin D Deficiency, Arthritis, history as stated, presented for follow up examination. Patient is compliant with all medications. Denies shortness of breath, chest pain or abdominal pains at this time. ROS as stated.\par \par

## 2020-02-16 NOTE — REVIEW OF SYSTEMS
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

## 2020-02-16 NOTE — ADDENDUM
[FreeTextEntry1] : Addendum: Feb 16, 2020\par \par Dx:\par Lower Back Pain - M54.5\par Arthritis - M19.90\par \par Medically Necessary to provide Light Weight Wheelchair, for safety.

## 2020-02-16 NOTE — HEALTH RISK ASSESSMENT
[No] : In the past 12 months have you used drugs other than those required for medical reasons? No [No falls in past year] : Patient reported no falls in the past year [0] : 2) Feeling down, depressed, or hopeless: Not at all (0) [] : No [de-identified] : CARD [[id number]] : 0

## 2020-02-16 NOTE — ASSESSMENT
[FreeTextEntry1] : 89 year old female found to have stable Acquired Hypothyroidism, CHF with preserved LVF, MR, Pulm HTN, Hypercholesterolemia, Elevated Hemoglobin A1c, Cervical Radiculopathy, Vitamin D Deficiency, Arthritis,with the current regimen, diet and life style modifications, as counseled. Prior results reviewed and discussed with the patient during today's examination. Plan as ordered.\par Weight loss is multifactorial, as counseled, prognosis remains guarded, aspiration precautions, as directed.\par Supplemental history was obtained from niece, who is accompanying the patient for today's examination.

## 2020-06-09 PROBLEM — I27.20 PULMONARY HTN: Status: ACTIVE | Noted: 2018-07-03

## 2020-06-09 PROBLEM — I50.30 HEART FAILURE WITH PRESERVED LEFT VENTRICULAR FUNCTION (HFPEF): Status: ACTIVE | Noted: 2018-06-06

## 2020-06-09 PROBLEM — I34.0 MITRAL REGURGITATION: Status: ACTIVE | Noted: 2018-06-06

## 2020-06-09 NOTE — REASON FOR VISIT
[Follow-Up - Clinic] : a clinic follow-up of [Hypertension] : hypertension [Mitral Regurgitation] : mitral regurgitation [Source: ______] : History obtained from [unfilled]

## 2020-06-09 NOTE — HISTORY OF PRESENT ILLNESS
[FreeTextEntry1] : 89-year-old female with hypothyroidism, HLD, scoliosis, HFpEF and severe mitral regurgitation with severe pulmonary HTN who presents for scheduled follow-up visit, accompanied by her HHA. \par \par Patient reports she is compliant with the medication daily. She denies any adverse effects such as dry cough. She denies orthopnea or PND, is able to do most of her ADLs without significant dyspnea. No symptoms at rest. No  chest pain, lightheadedness, or syncope. Has been more sedentary. \par \par Per HHA, patient has been having occasional dyspnea that resolved when patient was given the PRN furosemide. Patient denies symptoms.\par \par

## 2020-06-09 NOTE — ASSESSMENT
[FreeTextEntry1] : 89-year-old female with noted PMH including hypothyroidism and HLD who presents for follow-up of HFpEF and severe MR\par \par 1. HFpEF: Patient is on lisinopril and recently with use fo PRN furosemide\par -Recommend furosemide 20mg PO Q48H, with additional doses as needed. Patient's HHA instructed to monitor daily weights. Patient instructed in sodium restriction, checking daily weights, and indications for taking additional dose of furosemide including > 3 lb weight gain in 1 day, LE edema, or orthopnea/PND.\par -Will check chem 8 to assess renal function and electrolytes\par \par 2. Severe MR: We discussed that valvular disease requires surgical treatment, however patient again states that given her age and frailty she absolutely does not want any surgery, even including percutaneous approach. This may be reasonable as patient is at much higher risk of complications. \par -Will continue on low-dose lisinopril 5mg for afterload reduction in setting of MR and to help with mild diuresis. Her pHTN is likely secondary to the MR. \par -Low-dose furosemide as per above; cannot be very aggressive with diuresis given patient's age and AS giving some degree of preload dependence. \par \par 3.HLD: Patient out of benefit range of statin due to her age\par \par Patient will follow-up in the office in 3 months.\par \par Of note, patient's niece Sita should be called with any updates or questions at [phone]

## 2020-07-17 ENCOUNTER — APPOINTMENT (OUTPATIENT)
Dept: OTOLARYNGOLOGY | Facility: CLINIC | Age: 85
End: 2020-07-17

## 2020-09-09 ENCOUNTER — APPOINTMENT (OUTPATIENT)
Dept: CARDIOLOGY | Facility: CLINIC | Age: 85
End: 2020-09-09

## 2024-06-17 NOTE — ED ADULT TRIAGE NOTE - BSA (M2)
[0] : 2) Feeling down, depressed, or hopeless: Not at all (0) [PHQ-2 Negative - No further assessment needed] : PHQ-2 Negative - No further assessment needed [[id number]] : 0 1.5